# Patient Record
Sex: FEMALE | Race: WHITE | NOT HISPANIC OR LATINO | Employment: FULL TIME | URBAN - NONMETROPOLITAN AREA
[De-identification: names, ages, dates, MRNs, and addresses within clinical notes are randomized per-mention and may not be internally consistent; named-entity substitution may affect disease eponyms.]

---

## 2018-12-13 ENCOUNTER — TRANSCRIBE ORDERS (OUTPATIENT)
Dept: PODIATRY | Facility: CLINIC | Age: 38
End: 2018-12-13

## 2018-12-13 DIAGNOSIS — B35.1 FUNGAL INFECTION OF TOENAIL: Primary | ICD-10-CM

## 2018-12-27 ENCOUNTER — OFFICE VISIT (OUTPATIENT)
Dept: PODIATRY | Facility: CLINIC | Age: 38
End: 2018-12-27

## 2018-12-27 VITALS — BODY MASS INDEX: 35.32 KG/M2 | HEIGHT: 65 IN | WEIGHT: 212 LBS

## 2018-12-27 DIAGNOSIS — B35.1 ONYCHOMYCOSIS: Primary | ICD-10-CM

## 2018-12-27 PROCEDURE — 99203 OFFICE O/P NEW LOW 30 MIN: CPT | Performed by: PODIATRIST

## 2018-12-27 PROCEDURE — 11755 BIOPSY NAIL UNIT: CPT | Performed by: PODIATRIST

## 2018-12-27 NOTE — PROGRESS NOTES
Radha Hough  1980  38 y.o. female     Patient presents to clinic today with complaint of toenail fungus.  Patient states this has been an ongoing issue for the past 10 years.    12/27/2018  Chief Complaint   Patient presents with   • Left Foot - Nail Problem   • Right Foot - Nail Problem           History of Present Illness    Radha Hough is a 38 y.o. female who presents for evaluation of toenail issues on both feet.  She states she believe she has a nail fungus.  She states this has been an ongoing issue for approximately 10 years.  She is tried various antifungal treatments in the past as well as what seems to be a short course of oral antifungal medication.  She states she most recently was treated over the summer.  She feels that the fungus improved but again worsened over time.  She denies any pain or associated redness to the area.  She has no other acute pedal complaints at this time.      Past Medical History:   Diagnosis Date   • Callus          Past Surgical History:   Procedure Laterality Date   • KIDNEY STONE SURGERY           Family History   Problem Relation Age of Onset   • Heart disease Father    • Cancer Maternal Grandmother    • Heart disease Maternal Grandfather          Social History     Socioeconomic History   • Marital status:      Spouse name: Not on file   • Number of children: Not on file   • Years of education: Not on file   • Highest education level: Not on file   Social Needs   • Financial resource strain: Not on file   • Food insecurity - worry: Not on file   • Food insecurity - inability: Not on file   • Transportation needs - medical: Not on file   • Transportation needs - non-medical: Not on file   Occupational History   • Not on file   Tobacco Use   • Smoking status: Former Smoker   • Smokeless tobacco: Never Used   Substance and Sexual Activity   • Alcohol use: No     Frequency: Never   • Drug use: No   • Sexual activity: Defer   Other Topics Concern   • Not on file  "  Social History Narrative   • Not on file         Current Outpatient Medications   Medication Sig Dispense Refill   • Naltrexone-Bupropion HCl (CONTRAVE PO) Take  by mouth.       No current facility-administered medications for this visit.          OBJECTIVE    Ht 165.1 cm (65\")   Wt 96.2 kg (212 lb)   BMI 35.28 kg/m²       Review of Systems   Constitutional: Negative.    HENT: Negative.    Eyes: Negative.    Respiratory: Negative.    Cardiovascular: Negative.    Gastrointestinal: Negative.    Endocrine: Negative.    Genitourinary: Negative.    Musculoskeletal: Negative.    Skin: Negative.    Allergic/Immunologic: Negative.    Neurological: Negative.    Hematological: Negative.    Psychiatric/Behavioral: Negative.          Physical Exam   Constitutional: she appears well-developed and well-nourished.   HEENT: Normocephalic. Atraumatic.  CV: No CP. RRR  Resp: Non-labored respirations.  Psychiatric: she has a normal mood and affect. her behavior is normal.         Lower Extremity Exam:  Vascular: DP/PT pulses palpable 2+.   No edema  Foot warm  Neuro: Protective sensation intact, b/l.  DTRs intact  Negative Tinel over tarsal tunnel  Integument: No open wounds or lesions.  No erythema, scaling  No masses  Nails 1 through 5 bilateral mildly thickened and discolored with proximal colored streaking most notably in the Hallux toenails.  Musculoskeletal: LE muscle strength 5/5.   Gait normal  Ankle ROM decreased without pain or crepitus  STJ ROM full without pain or crepitus      Bilateral hallux nail biopsy:  Risks, benefits discussed.  Biopsy of nail plate taken with nail nipper  Scrapping of nail bed taken with small curette  Samples sent to [facility]  Pt tolerated well          ASSESSMENT AND PLAN    Radha was seen today for nail problem and nail problem.    Diagnoses and all orders for this visit:    Onychomycosis      -Comprehensive foot and ankle exam performed  -Educated pt on diagnosis, etiology and treatment of " onychomycosis  -Pt interested in antifungal therapy  -Biopsy first taken as above.  If results are positive will likely start her on Lamisil for 12 weeks.  It seems that she has not had a long enough duration of treatment with previous treatments tried.  -Recheck allowing biopsy results.            This document has been electronically signed by Rigo Grove DPM on December 31, 2018 11:23 AM     EMR Dragon/Transcription disclaimer:   Much of this encounter note is an electronic transcription/translation of spoken language to printed text. The electronic translation of spoken language may permit erroneous, or at times, nonsensical words or phrases to be inadvertently transcribed; Although I have reviewed the note for such errors, some may still exist.    Rigo Grove DPM  12/31/2018  11:23 AM

## 2019-01-29 ENCOUNTER — OFFICE VISIT (OUTPATIENT)
Dept: PODIATRY | Facility: CLINIC | Age: 39
End: 2019-01-29

## 2019-01-29 VITALS — HEIGHT: 65 IN | WEIGHT: 212 LBS | BODY MASS INDEX: 35.32 KG/M2

## 2019-01-29 DIAGNOSIS — B35.1 ONYCHOMYCOSIS: Primary | ICD-10-CM

## 2019-01-29 PROCEDURE — 99213 OFFICE O/P EST LOW 20 MIN: CPT | Performed by: PODIATRIST

## 2019-01-29 NOTE — PROGRESS NOTES
"Radha Hough  1980  38 y.o. female     Patient presents to clinic today for bako results.    01/29/2019    Chief Complaint   Patient presents with   • Left Foot - Follow-up   • Right Foot - Follow-up           History of Present Illness    Radha Hough is a 38 y.o. female who presents for follow-up of a dystrophic nails on bilateral feet.    Past Medical History:   Diagnosis Date   • Callus          Past Surgical History:   Procedure Laterality Date   • KIDNEY STONE SURGERY           Family History   Problem Relation Age of Onset   • Heart disease Father    • Cancer Maternal Grandmother    • Heart disease Maternal Grandfather          Social History     Socioeconomic History   • Marital status:      Spouse name: Not on file   • Number of children: Not on file   • Years of education: Not on file   • Highest education level: Not on file   Social Needs   • Financial resource strain: Not on file   • Food insecurity - worry: Not on file   • Food insecurity - inability: Not on file   • Transportation needs - medical: Not on file   • Transportation needs - non-medical: Not on file   Occupational History   • Not on file   Tobacco Use   • Smoking status: Former Smoker   • Smokeless tobacco: Never Used   Substance and Sexual Activity   • Alcohol use: No     Frequency: Never   • Drug use: No   • Sexual activity: Defer   Other Topics Concern   • Not on file   Social History Narrative   • Not on file         Current Outpatient Medications   Medication Sig Dispense Refill   • Naltrexone-Bupropion HCl (CONTRAVE PO) Take  by mouth.       No current facility-administered medications for this visit.          OBJECTIVE    Ht 165.1 cm (65\")   Wt 96.2 kg (212 lb)   BMI 35.28 kg/m²       Review of Systems   Constitutional: Negative.    HENT: Negative.    Eyes: Negative.    Respiratory: Negative.    Cardiovascular: Negative.    Gastrointestinal: Negative.    Endocrine: Negative.    Genitourinary: Negative.  "   Musculoskeletal: Negative.    Skin: Negative.    Allergic/Immunologic: Negative.    Neurological: Negative.    Hematological: Negative.    Psychiatric/Behavioral: Negative.          Physical Exam   Constitutional: she appears well-developed and well-nourished.   HEENT: Normocephalic. Atraumatic.  CV: No CP. RRR  Resp: Non-labored respirations.  Psychiatric: she has a normal mood and affect. her behavior is normal.         Lower Extremity Exam:  Vascular: DP/PT pulses palpable 2+.   No edema  Foot warm  Neuro: Protective sensation intact, b/l.  DTRs intact  Negative Tinel over tarsal tunnel  Integument: No open wounds or lesions.  No erythema, scaling  No masses  Nails 1 through 5 bilateral mildly thickened and discolored with proximal colored streaking most notably in the Hallux toenails.  Musculoskeletal: LE muscle strength 5/5.   Gait normal  Ankle ROM decreased without pain or crepitus  STJ ROM full without pain or crepitus    Hepatic Function Panel   Order: [id number]   Status:  Final result   Visible to patient:  No (Not Released) Dx:  Onychomycosis    Ref Range & Units 1d ago   Total Protein 6.3 - 8.6 g/dL 7.4    Albumin 3.40 - 4.80 g/dL 4.50    ALT (SGPT) 9 - 52 U/L 20    AST (SGOT) 14 - 36 U/L 24    Alkaline Phosphatase 38 - 126 U/L 68    Total Bilirubin 0.2 - 1.3 mg/dL 0.3    Bilirubin, Direct 0.0 - 0.3 mg/dL 0.0    Bilirubin, Indirect 0.0 - 1.1 mg/dL 0.4    Resulting Agency  [facility]         Specimen Collected: 01/30/19 11:54 Last Resulted: 01/30/19 12:45                   ASSESSMENT AND PLAN    Radha was seen today for follow-up and follow-up.    Diagnoses and all orders for this visit:    Onychomycosis  -     Hepatic Function Panel; Future      -Comprehensive foot and ankle exam performed  -Educated pt on diagnosis, etiology and treatment of onychomycosis  -Prior nail biopsy positive for saprophytic mold.  -LFTs within normal limits.  -Will place on itraconazole ×12 weeks  -Recheck LFTs in 4 weeks'  time  -Recheck 6 weeks            This document has been electronically signed by Rigo Grove DPM on January 31, 2019 1:41 PM     EMR Dragon/Transcription disclaimer:   Much of this encounter note is an electronic transcription/translation of spoken language to printed text. The electronic translation of spoken language may permit erroneous, or at times, nonsensical words or phrases to be inadvertently transcribed; Although I have reviewed the note for such errors, some may still exist.    Rigo Grove DPM  1/31/2019  1:41 PM

## 2019-01-30 ENCOUNTER — LAB (OUTPATIENT)
Dept: LAB | Facility: HOSPITAL | Age: 39
End: 2019-01-30

## 2019-01-30 DIAGNOSIS — B35.1 ONYCHOMYCOSIS: ICD-10-CM

## 2019-01-30 LAB
ALBUMIN SERPL-MCNC: 4.5 G/DL (ref 3.4–4.8)
ALP SERPL-CCNC: 68 U/L (ref 38–126)
ALT SERPL W P-5'-P-CCNC: 20 U/L (ref 9–52)
AST SERPL-CCNC: 24 U/L (ref 14–36)
BILIRUB CONJ SERPL-MCNC: 0 MG/DL (ref 0–0.3)
BILIRUB INDIRECT SERPL-MCNC: 0.4 MG/DL (ref 0–1.1)
BILIRUB SERPL-MCNC: 0.3 MG/DL (ref 0.2–1.3)
PROT SERPL-MCNC: 7.4 G/DL (ref 6.3–8.6)

## 2019-01-30 PROCEDURE — 80076 HEPATIC FUNCTION PANEL: CPT

## 2019-01-30 PROCEDURE — 36415 COLL VENOUS BLD VENIPUNCTURE: CPT

## 2019-02-01 ENCOUNTER — TELEPHONE (OUTPATIENT)
Dept: PODIATRY | Facility: CLINIC | Age: 39
End: 2019-02-01

## 2019-02-01 RX ORDER — TERBINAFINE HYDROCHLORIDE 250 MG/1
250 TABLET ORAL DAILY
Qty: 28 TABLET | Refills: 2 | Status: SHIPPED | OUTPATIENT
Start: 2019-02-01 | End: 2020-09-28

## 2019-02-01 NOTE — TELEPHONE ENCOUNTER
SPOKE TO PATIENT AND RELAYED MESSAGE YOU LEFT WITH ME.    DR WILL HAVE SOMETHING NEW SENT TO THE PHARMACY BY THE END OF THE DAY TO REPLACE MED THAT IS NOT COVERED BY INSURANCE.

## 2019-02-01 NOTE — TELEPHONE ENCOUNTER
LACIE      SAYS THAT THE MEDS DOCTOR PRESCRIBED ARE NOT COVERED BY HER INSURANCE ($1200).    IS THERE SOMETHING ELSE SHE CAN TAKE IN REPLACEMENT?

## 2019-03-12 ENCOUNTER — TELEPHONE (OUTPATIENT)
Dept: PODIATRY | Facility: CLINIC | Age: 39
End: 2019-03-12

## 2019-03-12 DIAGNOSIS — B35.1 ONYCHOMYCOSIS: Primary | ICD-10-CM

## 2019-03-19 ENCOUNTER — LAB (OUTPATIENT)
Dept: LAB | Facility: HOSPITAL | Age: 39
End: 2019-03-19

## 2019-03-19 DIAGNOSIS — B35.1 ONYCHOMYCOSIS: ICD-10-CM

## 2019-03-19 LAB
ALBUMIN SERPL-MCNC: 4.4 G/DL (ref 3.4–4.8)
ALP SERPL-CCNC: 58 U/L (ref 38–126)
ALT SERPL W P-5'-P-CCNC: 17 U/L (ref 9–52)
AST SERPL-CCNC: 27 U/L (ref 14–36)
BILIRUB CONJ SERPL-MCNC: 0 MG/DL (ref 0–0.3)
BILIRUB INDIRECT SERPL-MCNC: 0.5 MG/DL (ref 0–1.1)
BILIRUB SERPL-MCNC: 0.4 MG/DL (ref 0.2–1.3)
PROT SERPL-MCNC: 7.6 G/DL (ref 6.3–8.6)

## 2019-03-19 PROCEDURE — 36415 COLL VENOUS BLD VENIPUNCTURE: CPT

## 2019-03-19 PROCEDURE — 80076 HEPATIC FUNCTION PANEL: CPT

## 2019-04-09 ENCOUNTER — OFFICE VISIT (OUTPATIENT)
Dept: PODIATRY | Facility: CLINIC | Age: 39
End: 2019-04-09

## 2019-04-09 VITALS — HEIGHT: 65 IN | WEIGHT: 212 LBS | BODY MASS INDEX: 35.32 KG/M2

## 2019-04-09 DIAGNOSIS — B35.1 ONYCHOMYCOSIS: Primary | ICD-10-CM

## 2019-04-09 PROCEDURE — 99213 OFFICE O/P EST LOW 20 MIN: CPT | Performed by: PODIATRIST

## 2019-04-09 NOTE — PROGRESS NOTES
"Radha Hough  1980  38 y.o. female     Patient presents to clinic today for a recheck on a toenail issue.    04/09/2019      Chief Complaint   Patient presents with   • Left Foot - Follow-up   • Right Foot - Follow-up           History of Present Illness    Radha Hough is a 38 y.o. female who presents for follow-up of a dystrophic nails on bilateral feet.  She has taken 8 weeks worth of Lamisil and repeated her lab work since last visit.  She states that she has been due to  her final refill of Lamisil for a couple of weeks.    Past Medical History:   Diagnosis Date   • Callus          Past Surgical History:   Procedure Laterality Date   • KIDNEY STONE SURGERY           Family History   Problem Relation Age of Onset   • Heart disease Father    • Cancer Maternal Grandmother    • Heart disease Maternal Grandfather          Social History     Socioeconomic History   • Marital status:      Spouse name: Not on file   • Number of children: Not on file   • Years of education: Not on file   • Highest education level: Not on file   Tobacco Use   • Smoking status: Former Smoker   • Smokeless tobacco: Never Used   Substance and Sexual Activity   • Alcohol use: No     Frequency: Never   • Drug use: No   • Sexual activity: Defer         Current Outpatient Medications   Medication Sig Dispense Refill   • Naltrexone-Bupropion HCl (CONTRAVE PO) Take  by mouth.     • terbinafine (lamiSIL) 250 MG tablet Take 1 tablet by mouth Daily. 28 tablet 2     No current facility-administered medications for this visit.          OBJECTIVE    Ht 165.1 cm (65\")   Wt 96.2 kg (212 lb)   BMI 35.28 kg/m²       Review of Systems   Constitutional: Negative.    HENT: Negative.    Eyes: Negative.    Respiratory: Negative.    Cardiovascular: Negative.    Gastrointestinal: Negative.    Endocrine: Negative.    Genitourinary: Negative.    Musculoskeletal: Negative.    Skin: Negative.    Allergic/Immunologic: Negative.    Neurological: " Negative.    Hematological: Negative.    Psychiatric/Behavioral: Negative.          Physical Exam   Constitutional: she appears well-developed and well-nourished.   HEENT: Normocephalic. Atraumatic.  CV: No CP. RRR  Resp: Non-labored respirations.  Psychiatric: she has a normal mood and affect. her behavior is normal.         Lower Extremity Exam:  Vascular: DP/PT pulses palpable 2+.   No edema  Foot warm  Neuro: Protective sensation intact, b/l.  DTRs intact  Negative Tinel over tarsal tunnel  Integument: No open wounds or lesions.  No erythema, scaling  No masses  Nails 1 through 5 bilateral mildly thickened and discolored with proximal colored streaking most notably in the Hallux toenails.  Musculoskeletal: LE muscle strength 5/5.   Gait normal  Ankle ROM decreased without pain or crepitus  STJ ROM full without pain or crepitus    Hepatic Function Panel   Order: [id number]   Status:  Final result   Visible to patient:  Yes (MyChart) Dx:  Onychomycosis    Ref Range & Units 3wk ago   Total Protein 6.3 - 8.6 g/dL 7.6    Albumin 3.40 - 4.80 g/dL 4.40    ALT (SGPT) 9 - 52 U/L 17    AST (SGOT) 14 - 36 U/L 27    Alkaline Phosphatase 38 - 126 U/L 58    Total Bilirubin 0.2 - 1.3 mg/dL 0.4    Bilirubin, Direct 0.0 - 0.3 mg/dL 0.0    Bilirubin, Indirect 0.0 - 1.1 mg/dL 0.5    Resulting Agency  [facility]         Specimen Collected: 03/19/19 10:13 Last Resulted: 03/19/19 11:31               ASSESSMENT AND PLAN    Radha was seen today for follow-up and follow-up.    Diagnoses and all orders for this visit:    Onychomycosis      -Comprehensive foot and ankle exam performed  -Educated pt on diagnosis, etiology and treatment of onychomycosis  -LFTs are stable.  Complete terbinafine course.  -Recheck as needed            This document has been electronically signed by Rigo Grove DPM on April 14, 2019 6:11 PM     EMR Dragon/Transcription disclaimer:   Much of this encounter note is an electronic transcription/translation of  spoken language to printed text. The electronic translation of spoken language may permit erroneous, or at times, nonsensical words or phrases to be inadvertently transcribed; Although I have reviewed the note for such errors, some may still exist.    Rigo Grove DPM  4/14/2019  6:11 PM

## 2020-02-19 ENCOUNTER — OFFICE VISIT (OUTPATIENT)
Dept: FAMILY MEDICINE CLINIC | Facility: CLINIC | Age: 40
End: 2020-02-19

## 2020-02-19 VITALS
WEIGHT: 233.7 LBS | DIASTOLIC BLOOD PRESSURE: 74 MMHG | SYSTOLIC BLOOD PRESSURE: 120 MMHG | HEIGHT: 65 IN | BODY MASS INDEX: 38.93 KG/M2

## 2020-02-19 DIAGNOSIS — Z13.29 SCREENING FOR THYROID DISORDER: ICD-10-CM

## 2020-02-19 DIAGNOSIS — Z30.41 ENCOUNTER FOR SURVEILLANCE OF CONTRACEPTIVE PILLS: ICD-10-CM

## 2020-02-19 DIAGNOSIS — Z13.220 SCREENING FOR LIPID DISORDERS: ICD-10-CM

## 2020-02-19 DIAGNOSIS — Z76.89 ENCOUNTER TO ESTABLISH CARE: ICD-10-CM

## 2020-02-19 DIAGNOSIS — E66.09 CLASS 2 OBESITY DUE TO EXCESS CALORIES WITHOUT SERIOUS COMORBIDITY WITH BODY MASS INDEX (BMI) OF 35.0 TO 35.9 IN ADULT: Primary | ICD-10-CM

## 2020-02-19 DIAGNOSIS — F41.9 ANXIETY: ICD-10-CM

## 2020-02-19 PROBLEM — N20.0 KIDNEY STONES: Status: ACTIVE | Noted: 2020-02-19

## 2020-02-19 PROCEDURE — 99214 OFFICE O/P EST MOD 30 MIN: CPT | Performed by: NURSE PRACTITIONER

## 2020-02-19 RX ORDER — DESOGESTREL AND ETHINYL ESTRADIOL 0.15-0.03
1 KIT ORAL DAILY
COMMUNITY
End: 2021-11-10

## 2020-02-19 NOTE — PROGRESS NOTES
Subjective   Radha Hough is a 39 y.o. female.  Establish primary care.  Currently sees Dr. Hatfield at [facility] for GYN care and is up to date on her pap smear and mammogram. Has been on Contrave in the past and stopped due to nausea.    Obesity   This is a chronic problem. The current episode started more than 1 year ago. The problem occurs constantly. The problem has been unchanged. Pertinent negatives include no chills, diaphoresis, fatigue, fever, headaches, nausea, sore throat or vomiting. The symptoms are aggravated by drinking and eating. Treatments tried: Contrave  The treatment provided mild relief.   Contraception   This is a new problem. The current episode started more than 1 month ago. The problem occurs constantly. The problem has been gradually improving. Pertinent negatives include no chills, diaphoresis, fatigue, fever, headaches, nausea, sore throat or vomiting. Nothing aggravates the symptoms. Treatments tried: APRI. The treatment provided significant relief.   Anxiety   Presents for initial visit. Onset was 1 to 6 months ago. The problem has been unchanged. Patient reports no confusion, nausea or shortness of breath. Symptoms occur rarely. The symptoms are aggravated by family issues (son is autistic). The quality of sleep is good.     Past treatments include benzodiazephines. The treatment provided moderate relief. Compliance with prior treatments has been good.        The following portions of the patient's history were reviewed and updated as appropriate:   She  has a past medical history of Callus.  She does not have any pertinent problems on file.  She  has a past surgical history that includes Kidney stone surgery.  Her family history includes Cancer in her maternal grandmother; Heart disease in her father and maternal grandfather.  She  reports that she has quit smoking. She has never used smokeless tobacco. She reports that she does not drink alcohol or use drugs.  Current Outpatient  "Medications   Medication Sig Dispense Refill   • desogestrel-ethinyl estradiol (APRI) 0.15-30 MG-MCG per tablet Take 1 tablet by mouth Daily.     • Lorcaserin HCl ER 20 MG tablet sustained-release 24 hour Take 20 mg by mouth Daily. 30 tablet 5   • terbinafine (lamiSIL) 250 MG tablet Take 1 tablet by mouth Daily. 28 tablet 2     No current facility-administered medications for this visit.      Current Outpatient Medications on File Prior to Visit   Medication Sig   • desogestrel-ethinyl estradiol (APRI) 0.15-30 MG-MCG per tablet Take 1 tablet by mouth Daily.   • terbinafine (lamiSIL) 250 MG tablet Take 1 tablet by mouth Daily.   • [DISCONTINUED] Naltrexone-Bupropion HCl (CONTRAVE PO) Take  by mouth.     No current facility-administered medications on file prior to visit.      She has No Known Allergies..    Review of Systems   Constitutional: Negative.  Negative for chills, diaphoresis, fatigue and fever.   HENT: Negative.  Negative for ear pain, rhinorrhea and sore throat.    Eyes: Negative.    Respiratory: Negative.  Negative for shortness of breath.    Cardiovascular: Negative.    Gastrointestinal: Negative.  Negative for constipation, nausea and vomiting.   Genitourinary: Positive for dysuria.   Musculoskeletal: Negative.  Negative for gait problem.   Skin: Negative.    Neurological: Negative.  Negative for headaches.   Psychiatric/Behavioral: Negative.  Negative for confusion.       Objective    Visit Vitals  /74   Ht 165.1 cm (65\")   Wt 106 kg (233 lb 11.2 oz)   LMP 02/08/2020 (Exact Date)   BMI 38.89 kg/m²       Physical Exam   Constitutional: She is oriented to person, place, and time. She appears well-developed and well-nourished.   HENT:   Head: Normocephalic.   Right Ear: External ear normal.   Left Ear: External ear normal.   Eyes: Pupils are equal, round, and reactive to light. EOM are normal.   Neck: Normal range of motion. Neck supple.   Cardiovascular: Normal rate, regular rhythm and normal " heart sounds.   No murmur heard.  Pulmonary/Chest: Effort normal and breath sounds normal. She has no wheezes.   Abdominal: Soft. Bowel sounds are normal. She exhibits no mass. There is no tenderness. There is no guarding.   Musculoskeletal: Normal range of motion. She exhibits no edema or tenderness.   Neurological: She is alert and oriented to person, place, and time.   Skin: Skin is warm. Capillary refill takes less than 2 seconds.   Psychiatric: She has a normal mood and affect. Her behavior is normal.   Nursing note and vitals reviewed.      Assessment/Plan   Problems Addressed this Visit        Digestive    Class 2 obesity due to excess calories without serious comorbidity with body mass index (BMI) of 35.0 to 35.9 in adult - Primary    Relevant Medications    Lorcaserin HCl ER 20 MG tablet sustained-release 24 hour    Other Relevant Orders    CBC & Differential    Comprehensive Metabolic Panel       Other    Anxiety      Other Visit Diagnoses     Encounter for surveillance of contraceptive pills        Screening for thyroid disorder        Relevant Orders    TSH    Screening for lipid disorders        Relevant Orders    Lipid Panel    Encounter to establish care            New Medications Ordered This Visit   Medications   • Lorcaserin HCl ER 20 MG tablet sustained-release 24 hour     Sig: Take 20 mg by mouth Daily.     Dispense:  30 tablet     Refill:  5     1. Obesity:  Weight CBC and chemistry panel as ordered and will notify results when available  Discontinue Contrave  Begin Belviq as prescribed  Educated on possible side effects of this medication including but not limited to increased risk for nausea, indigestion and abdominal discomfort  Encouraged to reduce daily caloric intake to no more than 2000 toya/day  Encouraged to increase physical activity regimen to a minimum of 150 minutes/week  Discussed healthy food options such as baked chicken and fish, fresh fruits and vegetables and salads  Encouraged  to increase fluids to help remote hydration  Encouraged to increase fiber rich foods in diet    2. Oral contraceptive use  Taking Desogestrel-ethinyl estradiol 0.15-30 packet  Will continue gynecological follow-up with [facility] [place]    3. Anxiety  Currently taking Klonopin and reports that this is working.  Educated on possible sedative side effects of this medication    4.  Screening for thyroid disorder:  Complete TSH as ordered and will notify results when available    5.  Screening for lipid disorders:  Complete fasting lipid panel as ordered and will notify results when available  Encouraged adhere to low-fat diet    6.  Encounter to establish care:  Continue on current medications as previously prescribed   I spent 28 minutes in direct face to face contact with patient.  Greater than 50% of this time was spent counseling patient and discussing plan of care.  Return in about 1 year (around 2/19/2021) for Annual physical.        This document has been electronically signed by NISREEN Cazares on February 19, 2020 1:40 PM

## 2020-02-20 ENCOUNTER — LAB (OUTPATIENT)
Dept: LAB | Facility: HOSPITAL | Age: 40
End: 2020-02-20

## 2020-02-20 DIAGNOSIS — E66.09 CLASS 2 OBESITY DUE TO EXCESS CALORIES WITHOUT SERIOUS COMORBIDITY WITH BODY MASS INDEX (BMI) OF 35.0 TO 35.9 IN ADULT: ICD-10-CM

## 2020-02-20 DIAGNOSIS — Z13.220 SCREENING FOR LIPID DISORDERS: ICD-10-CM

## 2020-02-20 DIAGNOSIS — Z13.29 SCREENING FOR THYROID DISORDER: ICD-10-CM

## 2020-02-20 LAB
ALBUMIN SERPL-MCNC: 4 G/DL (ref 3.5–5.2)
ALBUMIN/GLOB SERPL: 1.4 G/DL
ALP SERPL-CCNC: 54 U/L (ref 39–117)
ALT SERPL W P-5'-P-CCNC: 12 U/L (ref 1–33)
ANION GAP SERPL CALCULATED.3IONS-SCNC: 11.1 MMOL/L (ref 5–15)
AST SERPL-CCNC: 16 U/L (ref 1–32)
BASOPHILS # BLD AUTO: 0.03 10*3/MM3 (ref 0–0.2)
BASOPHILS NFR BLD AUTO: 0.5 % (ref 0–1.5)
BILIRUB SERPL-MCNC: 0.2 MG/DL (ref 0.2–1.2)
BUN BLD-MCNC: 15 MG/DL (ref 6–20)
BUN/CREAT SERPL: 19.5 (ref 7–25)
CALCIUM SPEC-SCNC: 9 MG/DL (ref 8.6–10.5)
CHLORIDE SERPL-SCNC: 105 MMOL/L (ref 98–107)
CHOLEST SERPL-MCNC: 228 MG/DL (ref 0–200)
CO2 SERPL-SCNC: 22.9 MMOL/L (ref 22–29)
CREAT BLD-MCNC: 0.77 MG/DL (ref 0.57–1)
DEPRECATED RDW RBC AUTO: 39.6 FL (ref 37–54)
EOSINOPHIL # BLD AUTO: 0.14 10*3/MM3 (ref 0–0.4)
EOSINOPHIL NFR BLD AUTO: 2.5 % (ref 0.3–6.2)
ERYTHROCYTE [DISTWIDTH] IN BLOOD BY AUTOMATED COUNT: 12.8 % (ref 12.3–15.4)
GFR SERPL CREATININE-BSD FRML MDRD: 83 ML/MIN/1.73
GLOBULIN UR ELPH-MCNC: 2.8 GM/DL
GLUCOSE BLD-MCNC: 87 MG/DL (ref 65–99)
HCT VFR BLD AUTO: 40.3 % (ref 34–46.6)
HDLC SERPL-MCNC: 52 MG/DL (ref 40–60)
HGB BLD-MCNC: 13.5 G/DL (ref 12–15.9)
IMM GRANULOCYTES # BLD AUTO: 0.02 10*3/MM3 (ref 0–0.05)
IMM GRANULOCYTES NFR BLD AUTO: 0.4 % (ref 0–0.5)
LDLC SERPL CALC-MCNC: 134 MG/DL (ref 0–100)
LDLC/HDLC SERPL: 2.57 {RATIO}
LYMPHOCYTES # BLD AUTO: 1.43 10*3/MM3 (ref 0.7–3.1)
LYMPHOCYTES NFR BLD AUTO: 25.8 % (ref 19.6–45.3)
MCH RBC QN AUTO: 28.7 PG (ref 26.6–33)
MCHC RBC AUTO-ENTMCNC: 33.5 G/DL (ref 31.5–35.7)
MCV RBC AUTO: 85.6 FL (ref 79–97)
MONOCYTES # BLD AUTO: 0.42 10*3/MM3 (ref 0.1–0.9)
MONOCYTES NFR BLD AUTO: 7.6 % (ref 5–12)
NEUTROPHILS # BLD AUTO: 3.5 10*3/MM3 (ref 1.7–7)
NEUTROPHILS NFR BLD AUTO: 63.2 % (ref 42.7–76)
NRBC BLD AUTO-RTO: 0 /100 WBC (ref 0–0.2)
PLATELET # BLD AUTO: 283 10*3/MM3 (ref 140–450)
PMV BLD AUTO: 10.7 FL (ref 6–12)
POTASSIUM BLD-SCNC: 4.5 MMOL/L (ref 3.5–5.2)
PROT SERPL-MCNC: 6.8 G/DL (ref 6–8.5)
RBC # BLD AUTO: 4.71 10*6/MM3 (ref 3.77–5.28)
SODIUM BLD-SCNC: 139 MMOL/L (ref 136–145)
TRIGL SERPL-MCNC: 212 MG/DL (ref 0–150)
TSH SERPL DL<=0.05 MIU/L-ACNC: 1.58 UIU/ML (ref 0.27–4.2)
VLDLC SERPL-MCNC: 42.4 MG/DL (ref 5–40)
WBC NRBC COR # BLD: 5.54 10*3/MM3 (ref 3.4–10.8)

## 2020-02-20 PROCEDURE — 85025 COMPLETE CBC W/AUTO DIFF WBC: CPT

## 2020-02-20 PROCEDURE — 80061 LIPID PANEL: CPT

## 2020-02-20 PROCEDURE — 80053 COMPREHEN METABOLIC PANEL: CPT

## 2020-02-20 PROCEDURE — 84443 ASSAY THYROID STIM HORMONE: CPT

## 2020-02-22 DIAGNOSIS — E78.5 DYSLIPIDEMIA: Primary | ICD-10-CM

## 2020-02-24 ENCOUNTER — TELEPHONE (OUTPATIENT)
Dept: FAMILY MEDICINE CLINIC | Facility: CLINIC | Age: 40
End: 2020-02-24

## 2020-02-24 DIAGNOSIS — E66.09 CLASS 2 OBESITY DUE TO EXCESS CALORIES WITHOUT SERIOUS COMORBIDITY WITH BODY MASS INDEX (BMI) OF 35.0 TO 35.9 IN ADULT: Primary | ICD-10-CM

## 2020-02-24 RX ORDER — PEN NEEDLE, DIABETIC 30 GX3/16"
1 NEEDLE, DISPOSABLE MISCELLANEOUS DAILY
Qty: 30 EACH | Refills: 3 | Status: SHIPPED | OUTPATIENT
Start: 2020-02-24 | End: 2020-06-05 | Stop reason: SDUPTHER

## 2020-02-24 NOTE — TELEPHONE ENCOUNTER
Per NISREEN Dean, Ms. Hough has been called with recent lab results & recommendations.  Continue current medications and follow-up as planned or sooner if any problems.      ----- Message from NISREEN Cazares sent at 2/22/2020  4:30 PM CST -----  Cholesterol levels elevated.  She needs to adhere to low fat diet and increase exercising. Will repeat fasting lipid panel in 3 months.  All other labs normal.

## 2020-02-24 NOTE — PROGRESS NOTES
Per NISREEN Dean, Ms. Hough has been called with recent lab results & recommendations.  Continue current medications and follow-up as planned or sooner if any problems.

## 2020-06-05 DIAGNOSIS — E66.09 CLASS 2 OBESITY DUE TO EXCESS CALORIES WITHOUT SERIOUS COMORBIDITY WITH BODY MASS INDEX (BMI) OF 35.0 TO 35.9 IN ADULT: ICD-10-CM

## 2020-06-05 RX ORDER — PEN NEEDLE, DIABETIC 30 GX3/16"
1 NEEDLE, DISPOSABLE MISCELLANEOUS DAILY
Qty: 30 EACH | Refills: 3 | Status: SHIPPED | OUTPATIENT
Start: 2020-06-05 | End: 2020-09-28

## 2020-07-01 DIAGNOSIS — E66.09 CLASS 2 OBESITY DUE TO EXCESS CALORIES WITHOUT SERIOUS COMORBIDITY WITH BODY MASS INDEX (BMI) OF 35.0 TO 35.9 IN ADULT: ICD-10-CM

## 2020-07-01 RX ORDER — LIRAGLUTIDE 6 MG/ML
INJECTION, SOLUTION SUBCUTANEOUS
Qty: 5 PEN | Refills: 3 | Status: SHIPPED | OUTPATIENT
Start: 2020-07-01 | End: 2020-09-28

## 2020-09-28 ENCOUNTER — OFFICE VISIT (OUTPATIENT)
Dept: FAMILY MEDICINE CLINIC | Facility: CLINIC | Age: 40
End: 2020-09-28

## 2020-09-28 VITALS
HEIGHT: 65 IN | DIASTOLIC BLOOD PRESSURE: 64 MMHG | WEIGHT: 237 LBS | SYSTOLIC BLOOD PRESSURE: 106 MMHG | BODY MASS INDEX: 39.49 KG/M2

## 2020-09-28 DIAGNOSIS — E66.09 CLASS 2 OBESITY DUE TO EXCESS CALORIES WITHOUT SERIOUS COMORBIDITY WITH BODY MASS INDEX (BMI) OF 39.0 TO 39.9 IN ADULT: Primary | ICD-10-CM

## 2020-09-28 DIAGNOSIS — R12 HEARTBURN: ICD-10-CM

## 2020-09-28 PROCEDURE — 99213 OFFICE O/P EST LOW 20 MIN: CPT | Performed by: NURSE PRACTITIONER

## 2020-09-28 NOTE — PROGRESS NOTES
"Subjective   Radha Hough is a 40 y.o. female.  For the past several months has been complaining of increasing heartburn.  \"I started on Saxenda this past March and ever since I started taking it my heartburn was so bad.  Eventually it stopped taking it.\"  Continues to complain of increasing weight since stopping Saxenda.  Over the last several weeks has been trying to diet and increase her exercise.  \"I have lost about 5 or 6 pounds so I am doing better.\"    Heartburn  She complains of belching, heartburn and nausea. This is a chronic problem. The current episode started more than 1 month ago. The problem has been resolved. Pertinent negatives include no fatigue.   Obesity  This is a chronic problem. The current episode started more than 1 year ago. The problem occurs constantly. The problem has been unchanged. Associated symptoms include nausea. Pertinent negatives include no chills, diaphoresis, fatigue or fever. The symptoms are aggravated by drinking and eating. She has tried walking (Diet changes) for the symptoms. The treatment provided mild relief.        The following portions of the patient's history were reviewed and updated as appropriate:     Current Outpatient Medications   Medication Sig Dispense Refill   • desogestrel-ethinyl estradiol (APRI) 0.15-30 MG-MCG per tablet Take 1 tablet by mouth Daily.       No current facility-administered medications for this visit.      Current Outpatient Medications on File Prior to Visit   Medication Sig   • desogestrel-ethinyl estradiol (APRI) 0.15-30 MG-MCG per tablet Take 1 tablet by mouth Daily.   • [DISCONTINUED] terbinafine (lamiSIL) 250 MG tablet Take 1 tablet by mouth Daily.   • [DISCONTINUED] Insulin Pen Needle (PEN NEEDLES) 32G X 4 MM misc 1 each Daily. Use to inject Victoza Once daily as directed.   • [DISCONTINUED] SAXENDA 18 MG/3ML solution pen-injector START 0.6 MG SQ DAILY X1 WEEK, INCREASE BY 0.6 MG WEEKLY AS TOLERATED TO A MAX DOSE OF 3 MG SQ DAILY " "    No current facility-administered medications on file prior to visit.      She has No Known Allergies..    Review of Systems   Constitutional: Negative.  Negative for chills, diaphoresis, fatigue and fever.   HENT: Negative.    Respiratory: Negative.    Cardiovascular: Negative.    Gastrointestinal: Positive for heartburn and nausea.   Genitourinary: Negative.    Musculoskeletal: Negative.    Skin: Negative.    Neurological: Negative.    Psychiatric/Behavioral: Negative.  Negative for confusion.       Objective    Visit Vitals  /64   Ht 165.1 cm (65\")   Wt 108 kg (237 lb)   LMP 09/15/2020 (Exact Date)   BMI 39.44 kg/m²       Physical Exam  Vitals signs and nursing note reviewed.   Constitutional:       Appearance: She is well-developed.   HENT:      Head: Normocephalic.   Neck:      Musculoskeletal: Normal range of motion and neck supple.   Cardiovascular:      Rate and Rhythm: Normal rate and regular rhythm.      Heart sounds: Normal heart sounds.   Pulmonary:      Effort: Pulmonary effort is normal.      Breath sounds: Normal breath sounds.   Abdominal:      General: Bowel sounds are normal.      Palpations: Abdomen is soft.   Musculoskeletal: Normal range of motion.   Skin:     General: Skin is warm.      Capillary Refill: Capillary refill takes less than 2 seconds.   Neurological:      Mental Status: She is alert and oriented to person, place, and time.   Psychiatric:         Behavior: Behavior normal.         Assessment/Plan   Problems Addressed this Visit        Digestive    Class 2 obesity due to excess calories without serious comorbidity with body mass index (BMI) of 39.0 to 39.9 in adult - Primary      Other Visit Diagnoses     Heartburn          Diagnoses       Codes Comments    Class 2 obesity due to excess calories without serious comorbidity with body mass index (BMI) of 39.0 to 39.9 in adult    -  Primary ICD-10-CM: E66.09, Z68.39  ICD-9-CM: 278.00, V85.39     Heartburn     ICD-10-CM: " R12  ICD-9-CM: 787.1         1.  Class II obesity due to excess calories without serious comorbidity with a body mass index of 39.9-39.9 in adult:  Encouraged to continue with low calorie diet  Encouraged to continue with exercise routine of a minimum of 150 minutes/week  Discussed healthier food choices such as baked chicken and fish, salads and more fresh fruits and vegetables  Encouraged to walk a minimum of 10,000 steps per day  Has tried Saxenda and Contrave in the past with side effects to each medication  Discussed possibility of phentermine at next appointment if no improvement or worsening weight gain    2.  Heartburn:  Symptoms have resolved  May use over-the-counter products such as Prilosec, Nexium, Pepcid according to package directions    Continue on current medications as previously prescribed   I spent 19 minutes in direct face to face contact with patient.  Greater than 50% of this time was spent counseling patient and discussing plan of care.  Return in about 3 months (around 12/28/2020) for Recheck for obesity .        This document has been electronically signed by NISREEN Cazares on September 28, 2020 12:34 CDT

## 2020-10-01 ENCOUNTER — LAB (OUTPATIENT)
Dept: LAB | Facility: HOSPITAL | Age: 40
End: 2020-10-01

## 2020-10-01 DIAGNOSIS — E78.5 DYSLIPIDEMIA: ICD-10-CM

## 2020-10-01 LAB
CHOLEST SERPL-MCNC: 221 MG/DL (ref 0–200)
HDLC SERPL-MCNC: 53 MG/DL (ref 40–60)
LDLC SERPL CALC-MCNC: 125 MG/DL (ref 0–100)
LDLC/HDLC SERPL: 2.36 {RATIO}
TRIGL SERPL-MCNC: 215 MG/DL (ref 0–150)
VLDLC SERPL-MCNC: 43 MG/DL (ref 5–40)

## 2020-10-01 PROCEDURE — 80061 LIPID PANEL: CPT

## 2020-10-14 ENCOUNTER — TELEPHONE (OUTPATIENT)
Dept: FAMILY MEDICINE CLINIC | Facility: CLINIC | Age: 40
End: 2020-10-14

## 2020-10-14 NOTE — TELEPHONE ENCOUNTER
-Per NISREEN Gaona ,Ms Hough has been called with recent lab results & recommendations.  Continue current medications and follow-up as planned or sooner if any problems.      ---- Message from NISREEN Cazares sent at 10/10/2020  9:27 AM CDT -----  Slight improvement in cholesterol levels but they remain elevated.  She needs to continue to adhere to a low-fat diet and continue increasing exercise such as walking.  Oatmeal has also been shown to help reduce cholesterol levels.  She needs to begin taking fish oil without milligrams daily.  Just take it with breakfast and that will help to reduce triglyceride levels.

## 2020-10-14 NOTE — PROGRESS NOTES
Per NISREEN Gaona ,Ms Hough has been called with recent lab results & recommendations.  Continue current medications and follow-up as planned or sooner if any problems.

## 2021-09-01 ENCOUNTER — OFFICE VISIT (OUTPATIENT)
Dept: GASTROENTEROLOGY | Facility: CLINIC | Age: 41
End: 2021-09-01

## 2021-09-01 VITALS
DIASTOLIC BLOOD PRESSURE: 80 MMHG | BODY MASS INDEX: 41.09 KG/M2 | SYSTOLIC BLOOD PRESSURE: 126 MMHG | WEIGHT: 246.6 LBS | HEIGHT: 65 IN | HEART RATE: 85 BPM

## 2021-09-01 DIAGNOSIS — R10.13 EPIGASTRIC PAIN: ICD-10-CM

## 2021-09-01 DIAGNOSIS — K21.9 GASTROESOPHAGEAL REFLUX DISEASE, UNSPECIFIED WHETHER ESOPHAGITIS PRESENT: Primary | ICD-10-CM

## 2021-09-01 PROBLEM — N92.6 IRREGULAR MENSTRUAL CYCLE: Status: ACTIVE | Noted: 2021-07-10

## 2021-09-01 PROBLEM — F41.8 MIXED ANXIETY AND DEPRESSIVE DISORDER: Status: ACTIVE | Noted: 2021-07-10

## 2021-09-01 PROBLEM — K59.00 CONSTIPATION: Status: ACTIVE | Noted: 2021-07-10

## 2021-09-01 PROCEDURE — 99213 OFFICE O/P EST LOW 20 MIN: CPT | Performed by: NURSE PRACTITIONER

## 2021-09-01 RX ORDER — LOVASTATIN 10 MG/1
TABLET ORAL
COMMUNITY
Start: 2021-08-05 | End: 2022-05-26

## 2021-09-01 RX ORDER — DEXTROSE AND SODIUM CHLORIDE 5; .45 G/100ML; G/100ML
30 INJECTION, SOLUTION INTRAVENOUS CONTINUOUS PRN
Status: CANCELLED | OUTPATIENT
Start: 2021-10-13

## 2021-09-01 RX ORDER — CLONAZEPAM 0.5 MG/1
0.5 TABLET ORAL DAILY PRN
COMMUNITY
Start: 2021-08-20

## 2021-09-01 RX ORDER — OMEPRAZOLE 20 MG/1
CAPSULE, DELAYED RELEASE ORAL
COMMUNITY
Start: 2021-08-20 | End: 2022-05-26

## 2021-09-01 NOTE — PROGRESS NOTES
Chief Complaint   Patient presents with   • Heartburn       Subjective    Radha Hough is a 41 y.o. female. she is here today for follow-up.    41-year-old female presents to discuss worsening reflux.  States symptoms started about 2015 when she was pregnant gained a lot of weight.  States her weight has gone up and down about 60 pounds difference over the last 6 years and subsequently worsening reflux.  States she is going to have bariatric surgery if she continues to gain.  She has been taking omeprazole 20 recently increased omeprazole 40 but has not yet started that new dose.  She denies any choking but states she can feel food going down her esophagus at times it feels like it sits but then it goes on down and just burns as it goes.    Heartburn  She complains of abdominal pain, belching, early satiety and heartburn. She reports no chest pain, no choking, no coughing, no dysphagia, no globus sensation, no hoarse voice or no nausea. This is a chronic problem. The problem has been gradually worsening. The heartburn is located in the substernum. The heartburn is of moderate intensity. The heartburn wakes her from sleep. The heartburn doesn't change with position. The symptoms are aggravated by certain foods. Associated symptoms include fatigue. Risk factors include obesity and caffeine use (Gained weight over last pregnancy ). She has tried a PPI for the symptoms. The treatment provided mild relief. Past procedures do not include an EGD.     Plan; schedule patient for EGD due to upper abdominal pain chronic reflux will obtain biopsies to rule out Munson's esophagus and peptic ulcer       The following portions of the patient's history were reviewed and updated as appropriate:   Past Medical History:   Diagnosis Date   • Callus    • Heart murmur    • Kidney stones    • Migraines      Past Surgical History:   Procedure Laterality Date   • KIDNEY STONE SURGERY       Family History   Problem Relation Age of Onset  "  • Heart disease Father    • Colon polyps Father    • Cancer Maternal Grandmother    • Heart disease Maternal Grandfather      OB History    No obstetric history on file.       Prior to Admission medications    Medication Sig Start Date End Date Taking? Authorizing Provider   clonazePAM (KlonoPIN) 0.5 MG tablet Take 0.5 mg by mouth Daily As Needed. 8/20/21  Yes Iraj Cabello MD   lovastatin (MEVACOR) 10 MG tablet TAKE 1 TABLET BY MOUTH EVERY NIGHT WITH MEAL 8/5/21  Yes Iraj Cabello MD   desogestrel-ethinyl estradiol (APRI) 0.15-30 MG-MCG per tablet Take 1 tablet by mouth Daily.    Iraj Cabello MD   omeprazole (priLOSEC) 20 MG capsule TAKE ONE CAPSULE BY MOUTH daily 30 minutes BEFORE morning meal 8/20/21   Iraj Cabello MD     No Known Allergies  Social History     Socioeconomic History   • Marital status:      Spouse name: Not on file   • Number of children: Not on file   • Years of education: Not on file   • Highest education level: Not on file   Tobacco Use   • Smoking status: Former Smoker   • Smokeless tobacco: Never Used   Substance and Sexual Activity   • Alcohol use: No   • Drug use: No   • Sexual activity: Defer       Review of Systems  Review of Systems   Constitutional: Positive for fatigue. Negative for activity change, appetite change, chills, diaphoresis, fever and unexpected weight change.   HENT: Negative for hoarse voice and trouble swallowing.    Respiratory: Negative for cough and choking.    Cardiovascular: Negative for chest pain.   Gastrointestinal: Positive for abdominal pain and heartburn. Negative for abdominal distention, anal bleeding, blood in stool, constipation, diarrhea, dysphagia, nausea, rectal pain and vomiting.        /80 (BP Location: Left arm)   Pulse 85   Ht 165.1 cm (65\")   Wt 112 kg (246 lb 9.6 oz)   BMI 41.04 kg/m²     Objective    Physical Exam  Constitutional:       General: She is not in acute distress.     Appearance: " Normal appearance. She is normal weight. She is not ill-appearing.   HENT:      Head: Normocephalic and atraumatic.   Pulmonary:      Effort: Pulmonary effort is normal.   Abdominal:      General: Bowel sounds are normal. There is no distension.      Palpations: Abdomen is soft. There is no mass.      Tenderness: There is no abdominal tenderness.   Neurological:      Mental Status: She is alert.       Lab on 10/01/2020   Component Date Value Ref Range Status   • Total Cholesterol 10/01/2020 221* 0 - 200 mg/dL Final   • Triglycerides 10/01/2020 215* 0 - 150 mg/dL Final   • HDL Cholesterol 10/01/2020 53  40 - 60 mg/dL Final   • LDL Cholesterol  10/01/2020 125* 0 - 100 mg/dL Final   • VLDL Cholesterol 10/01/2020 43* 5 - 40 mg/dL Final   • LDL/HDL Ratio 10/01/2020 2.36   Final     Assessment/Plan      1. Gastroesophageal reflux disease, unspecified whether esophagitis present    2. Epigastric pain    .       Orders placed during this encounter include:  Orders Placed This Encounter   Procedures   • Follow Anesthesia Guidelines / Standing Orders     Standing Status:   Future   • Obtain Informed Consent     Standing Status:   Future     Order Specific Question:   Informed Consent Given For     Answer:   ESOPHAGOGASTRODUODENOSCOPY possible dilation       ESOPHAGOGASTRODUODENOSCOPY possible dilation (N/A)    Review and/or summary of lab tests, radiology, procedures, medications. Review and summary of old records and obtaining of history. The risks and benefits of my recommendations, as well as other treatment options were discussed with the patient today. Questions were answered.    No orders of the defined types were placed in this encounter.      Follow-up: Return in about 4 weeks (around 9/29/2021) for Recheck.          This document has been electronically signed by NISREEN Cervantes on September 1, 2021 14:12 CDT           I spent 16 minutes caring for Radha on this date of service. This time includes time spent by me  in the following activities:preparing for the visit, reviewing tests, obtaining and/or reviewing a separately obtained history, performing a medically appropriate examination and/or evaluation , counseling and educating the patient/family/caregiver, ordering medications, tests, or procedures, referring and communicating with other health care professionals , documenting information in the medical record and care coordination

## 2021-09-01 NOTE — PATIENT INSTRUCTIONS
MyPlate from USDA    MyPlate is an outline of a general healthy diet based on the 2010 Dietary Guidelines for Americans, from the U.[place] Department of Agriculture (USDA). It sets guidelines for how much food you should eat from each food group based on your age, sex, and level of physical activity.  What are tips for following MyPlate?  To follow MyPlate recommendations:  · Eat a wide variety of fruits and vegetables, grains, and protein foods.  · Serve smaller portions and eat less food throughout the day.  · Limit portion sizes to avoid overeating.  · Enjoy your food.  · Get at least 150 minutes of exercise every week. This is about 30 minutes each day, 5 or more days per week.  It can be difficult to have every meal look like MyPlate. Think about MyPlate as eating guidelines for an entire day, rather than each individual meal.  Fruits and vegetables  · Make half of your plate fruits and vegetables.  · Eat many different colors of fruits and vegetables each day.  · For a 2,000 calorie daily food plan, eat:  ? 2½ cups of vegetables every day.  ? 2 cups of fruit every day.  · 1 cup is equal to:  ? 1 cup raw or cooked vegetables.  ? 1 cup raw fruit.  ? 1 medium-sized orange, apple, or banana.  ? 1 cup 100% fruit or vegetable juice.  ? 2 cups raw leafy greens, such as lettuce, spinach, or kale.  ? ½ cup dried fruit.  Grains  · One fourth of your plate should be grains.  · Make at least half of the grains you eat each day whole grains.  · For a 2,000 calorie daily food plan, eat 6 oz of grains every day.  · 1 oz is equal to:  ? 1 slice bread.  ? 1 cup cereal.  ? ½ cup cooked rice, cereal, or pasta.  Protein  · One fourth of your plate should be protein.  · Eat a wide variety of protein foods, including meat, poultry, fish, eggs, beans, nuts, and tofu.  · For a 2,000 calorie daily food plan, eat 5½ oz of protein every day.  · 1 oz is equal to:  ? 1 oz meat, poultry, or fish.  ? ¼ cup cooked beans.  ? 1 egg.  ? ½ oz nuts  or seeds.  ? 1 Tbsp peanut butter.  Dairy  · Drink fat-free or low-fat (1%) milk.  · Eat or drink dairy as a side to meals.  · For a 2,000 calorie daily food plan, eat or drink 3 cups of dairy every day.  · 1 cup is equal to:  ? 1 cup milk, yogurt, cottage cheese, or soy milk (soy beverage).  ? 2 oz processed cheese.  ? 1½ oz natural cheese.  Fats, oils, salt, and sugars  · Only small amounts of oils are recommended.  · Avoid foods that are high in calories and low in nutritional value (empty calories), like foods high in fat or added sugars.  · Choose foods that are low in salt (sodium). Choose foods that have less than 140 milligrams (mg) of sodium per serving.  · Drink water instead of sugary drinks. Drink enough water each day to keep your urine pale yellow.  Where to find support  · Work with your health care provider or a nutrition specialist (dietitian) to develop a customized eating plan that is right for you.  · Download an shailesh (mobile application) to help you track your daily food intake.  Where to find more information  · Go to ChooseMyPlate.gov for more information.  Summary  · MyPlate is a general guideline for healthy eating from the USDA. It is based on the 2010 Dietary Guidelines for Americans.  · In general, fruits and vegetables should take up ½ of your plate, grains should take up ¼ of your plate, and protein should take up ¼ of your plate.  This information is not intended to replace advice given to you by your health care provider. Make sure you discuss any questions you have with your health care provider.  Document Revised: 05/21/2020 Document Reviewed: 03/19/2018  Elsevier Patient Education © 2021 [employer]    Food Choices for Gastroesophageal Reflux Disease, Adult  When you have gastroesophageal reflux disease (GERD), the foods you eat and your eating habits are very important. Choosing the right foods can help ease your discomfort. Think about working with a food expert (dietitian) to  help you make good choices.  What are tips for following this plan?  Reading food labels  · Read the label for foods that are low in saturated fat. Foods that may help with your symptoms include:  ? Foods that have less than 5% of daily value (DV) of fat.  ? Foods that have 0 grams of trans fat.  Cooking  · Do not song your food. Cook your food by baking, steaming, grilling, or broiling. These are all methods that do not need a lot of fat for cooking.  · To add flavor, try to use herbs that are low in spice and acidity.  Meal planning    · Choose healthy foods that are low in fat, such as:  ? Fruits and vegetables.  ? Whole grains.  ? Low-fat dairy products.  ? Lean meats, fish, and poultry.  · Eat small meals often instead of eating 3 large meals each day. Eat your meals slowly in a place where you are relaxed. Avoid bending over or lying down until 2-3 hours after eating.  · Limit high-fat foods such as fatty meats or fried foods.  · Limit your intake of oils, butter, and shortening to less than 8 teaspoons each day.  · Avoid the following:  ? Foods that cause symptoms. These may be different for different people. Keep a food diary to keep track of foods that cause symptoms.  ? Alcohol.  ? Drinking a lot of liquid with meals.  ? Eating meals during the 2-3 hours before bed.  Lifestyle  · Stay at a healthy weight. Ask your doctor what weight is healthy for you. If you need to lose weight, work with your doctor to do so safely.  · Exercise for at least 30 minutes on 5 or more days each week, or as told by your doctor.  · Wear loose-fitting clothes.  · Do not use any products that contain nicotine or tobacco, such as cigarettes, e-cigarettes, and chewing tobacco. If you need help quitting, ask your doctor.  · Sleep with the head of your bed higher than your feet. Use a wedge under the mattress or blocks under the bed frame to raise the head of the bed.  What foods should eat?    Eat a healthy, well-balanced diet of  fruits, vegetables, whole grains, low-fat dairy products, lean meats, fish, and poultry. Each person is different. Foods that may cause symptoms in one person may not cause any symptoms in another person. Work with your doctor to find foods that are safe for you.  The items listed above may not be a complete list of foods and beverages you can eat. Contact a dietitian for more information.  What foods should I avoid?  Limiting some of these foods may help in managing the symptoms of GERD. Everyone is different. Talk with a food expert or your doctor to help you find the exact foods to avoid, if any.  Fruits  Any fruits prepared with added fat. Any fruits that cause symptoms. For some people, this may include citrus fruits, such as oranges, grapefruit, pineapple, and corrine.  Vegetables  Deep-fried vegetables. French fries. Any vegetables prepared with added fat. Any vegetables that cause symptoms. For some people, this may include tomatoes and tomato products, chili peppers, onions and garlic, and horseradish.  Grains  Pastries or quick breads with added fat.  Meats and other proteins  High-fat meats, such as fatty beef or pork, hot dogs, ribs, ham, sausage, salami, and bowman. Fried meat or protein, including fried fish and fried chicken. Nuts and nut butters.  Dairy  Whole milk and chocolate milk. Sour cream. Cream. Ice cream. Cream cheese. Milkshakes.  Fats and oils  Butter. Margarine. Shortening. Ghee.  Beverages  Coffee and tea, with or without caffeine. Carbonated beverages. Sodas. Energy drinks. Fruit juice made with acidic fruits (such as orange or grapefruit). Tomato juice. Alcoholic drinks.  Sweets and desserts  Chocolate and cocoa. Donuts.  Seasonings and condiments  Pepper. Peppermint and spearmint. Added salt. Any condiments, herbs, or seasonings that cause symptoms. For some people, this may include knight, hot sauce, or vinegar-based salad dressings.  The items listed above may not be a complete list of  foods and beverages you should avoid. Contact a dietitian for more information.  Questions to ask your doctor  Diet and lifestyle changes are often the first steps that are taken to manage symptoms of GERD. If diet and lifestyle changes do not help, talk with your doctor about taking medicines.  Where to find more information  · [employer]: aboutgerd.org  Summary  · When you have GERD, food and lifestyle choices are very important in easing your symptoms.  · Eat small meals often instead of 3 large meals a day. Eat your meals slowly and in a place where you are relaxed.  · Avoid bending over or lying down until 2-3 hours after eating.  · Limit high-fat foods such as fatty meat or fried foods.  This information is not intended to replace advice given to you by your health care provider. Make sure you discuss any questions you have with your health care provider.  Document Revised: 10/12/2020 Document Reviewed: 10/12/2020  Elsevier Patient Education © 2021 [employer]

## 2021-10-01 PROCEDURE — 87635 SARS-COV-2 COVID-19 AMP PRB: CPT | Performed by: FAMILY MEDICINE

## 2021-11-11 ENCOUNTER — HOSPITAL ENCOUNTER (EMERGENCY)
Facility: HOSPITAL | Age: 41
Discharge: HOME | End: 2021-11-11
Admitting: NURSE PRACTITIONER

## 2021-11-11 VITALS
RESPIRATION RATE: 16 BRPM | DIASTOLIC BLOOD PRESSURE: 69 MMHG | HEART RATE: 67 BPM | TEMPERATURE: 97.8 F | OXYGEN SATURATION: 100 % | SYSTOLIC BLOOD PRESSURE: 117 MMHG

## 2021-11-11 PROCEDURE — 25010000002 INJECTION, CASIRIVIMAB AND IMDEVIMAB, 1200 MG: Performed by: NURSE PRACTITIONER

## 2021-11-11 PROCEDURE — 99202 OFFICE O/P NEW SF 15 MIN: CPT

## 2021-11-11 PROCEDURE — M0243 CASIRIVI AND IMDEVI INFUSION: HCPCS | Performed by: NURSE PRACTITIONER

## 2021-11-11 RX ORDER — SODIUM CHLORIDE 9 MG/ML
30 INJECTION, SOLUTION INTRAVENOUS ONCE
Status: COMPLETED | OUTPATIENT
Start: 2021-11-11 | End: 2021-11-11

## 2021-11-11 RX ORDER — DIPHENHYDRAMINE HCL 50 MG
50 CAPSULE ORAL ONCE AS NEEDED
Status: DISCONTINUED | OUTPATIENT
Start: 2021-11-11 | End: 2021-11-11 | Stop reason: HOSPADM

## 2021-11-11 RX ORDER — DIPHENHYDRAMINE HYDROCHLORIDE 50 MG/ML
50 INJECTION INTRAMUSCULAR; INTRAVENOUS ONCE AS NEEDED
Status: DISCONTINUED | OUTPATIENT
Start: 2021-11-11 | End: 2021-11-11 | Stop reason: HOSPADM

## 2021-11-11 RX ORDER — EPINEPHRINE 1 MG/ML
0.3 INJECTION, SOLUTION INTRAMUSCULAR; SUBCUTANEOUS ONCE AS NEEDED
Status: DISCONTINUED | OUTPATIENT
Start: 2021-11-11 | End: 2021-11-11 | Stop reason: HOSPADM

## 2021-11-11 RX ORDER — METHYLPREDNISOLONE SODIUM SUCCINATE 125 MG/2ML
125 INJECTION, POWDER, LYOPHILIZED, FOR SOLUTION INTRAMUSCULAR; INTRAVENOUS ONCE AS NEEDED
Status: DISCONTINUED | OUTPATIENT
Start: 2021-11-11 | End: 2021-11-11 | Stop reason: HOSPADM

## 2021-11-11 RX ADMIN — IMDEVIMAB: 1332 INJECTION, SOLUTION, CONCENTRATE INTRAVENOUS at 10:25

## 2021-11-11 RX ADMIN — SODIUM CHLORIDE 30 ML: 9 INJECTION, SOLUTION INTRAVENOUS at 10:43

## 2022-05-26 PROBLEM — Z98.84 BARIATRIC SURGERY STATUS: Status: ACTIVE | Noted: 2022-05-26

## 2023-03-29 ENCOUNTER — PREP FOR SURGERY (OUTPATIENT)
Dept: OTHER | Facility: HOSPITAL | Age: 43
End: 2023-03-29
Payer: COMMERCIAL

## 2023-03-29 DIAGNOSIS — Z80.0 FAMILY HISTORY OF MALIGNANT NEOPLASM OF GASTROINTESTINAL TRACT: Primary | ICD-10-CM

## 2023-03-29 RX ORDER — SODIUM CHLORIDE 9 MG/ML
40 INJECTION, SOLUTION INTRAVENOUS AS NEEDED
OUTPATIENT
Start: 2023-03-29

## 2023-03-29 RX ORDER — DEXTROSE AND SODIUM CHLORIDE 5; .45 G/100ML; G/100ML
30 INJECTION, SOLUTION INTRAVENOUS CONTINUOUS PRN
OUTPATIENT
Start: 2023-03-29

## 2023-06-01 ENCOUNTER — HOSPITAL ENCOUNTER (OUTPATIENT)
Facility: HOSPITAL | Age: 43
Setting detail: HOSPITAL OUTPATIENT SURGERY
Discharge: HOME | End: 2023-06-01
Attending: INTERNAL MEDICINE | Admitting: INTERNAL MEDICINE
Payer: COMMERCIAL

## 2023-06-01 ENCOUNTER — ANESTHESIA EVENT (OUTPATIENT)
Dept: GASTROENTEROLOGY | Facility: HOSPITAL | Age: 43
End: 2023-06-01
Payer: COMMERCIAL

## 2023-06-01 ENCOUNTER — ANESTHESIA (OUTPATIENT)
Dept: GASTROENTEROLOGY | Facility: HOSPITAL | Age: 43
End: 2023-06-01
Payer: COMMERCIAL

## 2023-06-01 VITALS
BODY MASS INDEX: 31.16 KG/M2 | HEART RATE: 73 BPM | TEMPERATURE: 96.4 F | RESPIRATION RATE: 18 BRPM | DIASTOLIC BLOOD PRESSURE: 70 MMHG | SYSTOLIC BLOOD PRESSURE: 107 MMHG | HEIGHT: 65 IN | OXYGEN SATURATION: 100 % | WEIGHT: 187 LBS

## 2023-06-01 DIAGNOSIS — Z80.0 FAMILY HISTORY OF MALIGNANT NEOPLASM OF GASTROINTESTINAL TRACT: ICD-10-CM

## 2023-06-01 PROCEDURE — 25010000002 PROPOFOL 10 MG/ML EMULSION: Performed by: NURSE ANESTHETIST, CERTIFIED REGISTERED

## 2023-06-01 RX ORDER — SODIUM CHLORIDE 9 MG/ML
40 INJECTION, SOLUTION INTRAVENOUS AS NEEDED
Status: DISCONTINUED | OUTPATIENT
Start: 2023-06-01 | End: 2023-06-01 | Stop reason: HOSPADM

## 2023-06-01 RX ORDER — PROPOFOL 10 MG/ML
VIAL (ML) INTRAVENOUS AS NEEDED
Status: DISCONTINUED | OUTPATIENT
Start: 2023-06-01 | End: 2023-06-01 | Stop reason: SURG

## 2023-06-01 RX ORDER — DEXTROSE AND SODIUM CHLORIDE 5; .45 G/100ML; G/100ML
30 INJECTION, SOLUTION INTRAVENOUS CONTINUOUS PRN
Status: DISCONTINUED | OUTPATIENT
Start: 2023-06-01 | End: 2023-06-01 | Stop reason: HOSPADM

## 2023-06-01 RX ADMIN — PROPOFOL 70 MG: 10 INJECTION, EMULSION INTRAVENOUS at 10:07

## 2023-06-01 RX ADMIN — DEXTROSE AND SODIUM CHLORIDE 30 ML/HR: 5; 450 INJECTION, SOLUTION INTRAVENOUS at 09:46

## 2023-06-01 RX ADMIN — PROPOFOL 10 MG: 10 INJECTION, EMULSION INTRAVENOUS at 10:16

## 2023-06-01 RX ADMIN — PROPOFOL 20 MG: 10 INJECTION, EMULSION INTRAVENOUS at 10:12

## 2023-06-01 RX ADMIN — PROPOFOL 20 MG: 10 INJECTION, EMULSION INTRAVENOUS at 10:18

## 2023-06-01 RX ADMIN — PROPOFOL 30 MG: 10 INJECTION, EMULSION INTRAVENOUS at 10:11

## 2023-06-01 RX ADMIN — PROPOFOL 20 MG: 10 INJECTION, EMULSION INTRAVENOUS at 10:13

## 2023-06-01 RX ADMIN — PROPOFOL 10 MG: 10 INJECTION, EMULSION INTRAVENOUS at 10:09

## 2023-06-01 RX ADMIN — PROPOFOL 20 MG: 10 INJECTION, EMULSION INTRAVENOUS at 10:08

## 2023-06-01 NOTE — H&P
Benji Chavez DO,[facility]  Gastroenterology  Hepatology  Endoscopy  Board Certified in Internal Medicine and gastroenterology  [address], [address]  [place]. [address]  - [phone]   F - [phone]     GASTROENTEROLOGY HISTORY AND PHYSICAL  NOTE   BENJI CHAVEZ DO.         SUBJECTIVE:   2023    Name: Radha Hough  DOD: 1980        Chief Complaint:     Subjective : Family history of colon cancer in father    Patient is 43 y.o. female presents with desire for elective colonoscopy.      ROS/HISTORY/ CURRENT MEDICATIONS/OBJECTIVE/VS/PE:   Review of Systems:  All systems unremarkable unless specified below.  Constitutional   HENT  Eyes   Respiratory    Cardiovascular  Gastrointestinal   Endocrine  Genitourinary    Musculoskeletal   Skin  Allergic/Immunologic    Neurological    Hematological  Psychiatric/Behavioral    History:     Past Medical History:   Diagnosis Date   • Anxiety    • Callus    • GERD (gastroesophageal reflux disease)     resolved after bypass surgery   • Heart murmur    • Kidney stones    • Migraines      Past Surgical History:   Procedure Laterality Date   • GASTRIC BYPASS     • KIDNEY STONE SURGERY       Family History   Problem Relation Age of Onset   • Heart disease Father    • Colon polyps Father    • Cancer Maternal Grandmother    • Heart disease Maternal Grandfather      Social History     Tobacco Use   • Smoking status: Former     Types: Cigarettes     Quit date:      Years since quittin.4   • Smokeless tobacco: Never   Vaping Use   • Vaping Use: Never used   Substance Use Topics   • Alcohol use: No   • Drug use: No     Prior to Admission medications    Medication Sig Start Date End Date Taking? Authorizing Provider   Calcium Citrate 150 MG capsule Take 1 capsule by mouth Daily.    Emergency, Nurse Jasmin RN   Cholecalciferol (VITAMIN D-3 PO) Take 1 tablet by mouth Daily.    Emergency, Nurse Jasmin RN   clonazePAM (KlonoPIN) 0.5 MG tablet Take 1  tablet by mouth Daily As Needed. 8/20/21   Provider, MD Iraj   Omega-3 Fatty Acids (fish oil) 1000 MG capsule capsule Take 2 capsules by mouth Daily With Breakfast.    Emergency, Nurse Jasmin, RN   lovastatin (MEVACOR) 10 MG tablet TAKE 1 TABLET BY MOUTH EVERY NIGHT WITH MEAL 8/5/21 5/26/22  ProviderIraj MD     Allergies:  Patient has no known allergies.    I have reviewed the patients medical history, surgical history and family history in the available medical record system.     Current Medications:     Current Facility-Administered Medications   Medication Dose Route Frequency Provider Last Rate Last Admin   • dextrose 5 % and sodium chloride 0.45 % infusion  30 mL/hr Intravenous Continuous PRN Rigo Guzman DO 30 mL/hr at 06/01/23 0946 30 mL/hr at 06/01/23 0946   • sodium chloride 0.9 % infusion 40 mL  40 mL Intravenous PRN Rigo Guzman DO           Objective     Physical Exam:   Temp:  [97.3 °F (36.3 °C)] 97.3 °F (36.3 °C)  Heart Rate:  [60] 60  Resp:  [18] 18  BP: (95)/(58) 95/58    Physical Exam:  General Appearance:    Alert, cooperative, in no acute distress   Head:    Normocephalic, without obvious abnormality, atraumatic   Eyes:            Lids and lashes normal, conjunctivae and sclerae normal, no icterus, no pallor, corneas clear, PERRLA   Ears:    Ears appear intact with no abnormalities noted   Throat:   No oral lesions, no thrush, oral mucosa moist   Neck:   No adenopathy, supple, trachea midline, no thyromegaly, no  carotid bruit, no JVD   Back:     No kyphosis present, no scoliosis present, no skin lesions,   erythema or scars, no tenderness to percussion or                 palpation,  range of motion normal   Lungs:     Clear to auscultation,respirations regular, even and         unlabored    Heart:    Regular rhythm and normal rate, normal S1 and S2, no  murmur, no gallop, no rub, no click   Breast Exam:    Deferred   Abdomen:     Normal bowel sounds, no masses, no  organomegaly, soft  nontender, nondistended, no guarding, no rebound                 tenderness   Genitalia:    Deferred   Extremities:   Moves all extremities well, no edema, no cyanosis, no          redness   Pulses:   Pulses palpable and equal bilaterally   Skin:   No bleeding, bruising or rash   Lymph nodes:   No palpable adenopathy   Neurologic:   Cranial nerves 2 - 12 grossly intact, sensation intact, DTR     present and equal bilaterally      Results Review:     Lab Results   Component Value Date    WBC 5.54 02/20/2020    HGB 13.5 02/20/2020    HCT 40.3 02/20/2020     02/20/2020             No results found for: LIPASE  No results found for: INR  No results found for: CULTURE    Radiology Review:  Imaging Results (Last 72 Hours)     ** No results found for the last 72 hours. **           I reviewed the patient's new clinical results.  I reviewed the patient's new imaging results and agree with the interpretation.     ASSESSMENT/PLAN:   ASSESSMENT:  1.  Screening for high risk for colon cancer  2.  Family history of colon cancer    PLAN:  1.  Colonoscopy    Risk and benefits associated with the procedure are reviewed with the patient.  The patient wished to proceed     Rigo Guzman DO  06/01/23  09:57 CDT

## 2023-06-01 NOTE — ANESTHESIA POSTPROCEDURE EVALUATION
Patient: Radha Hough    Procedure Summary     Date: 06/01/23 Room / Location: [facility] ENDOSCOPY 2 / [facility] ENDOSCOPY    Anesthesia Start: 1002 Anesthesia Stop: 1022    Procedure: COLONOSCOPY Diagnosis:       Family history of malignant neoplasm of gastrointestinal tract      (Family history of malignant neoplasm of gastrointestinal tract [Z80.0])    Surgeons: Rigo Guzman DO Provider: Brooks Chase CRNA    Anesthesia Type: general ASA Status: 2          Anesthesia Type: general    Vitals  No vitals data found for the desired time range.          Post Anesthesia Care and Evaluation    Patient location during evaluation: PHASE II  Patient participation: waiting for patient participation  Level of consciousness: sleepy but conscious  Pain management: adequate    Airway patency: patent  Anesthetic complications: No anesthetic complications  PONV Status: none  Cardiovascular status: acceptable  Respiratory status: acceptable, spontaneous ventilation and room air  Hydration status: acceptable

## 2023-06-01 NOTE — ANESTHESIA PREPROCEDURE EVALUATION
Anesthesia Evaluation     Patient summary reviewed and Nursing notes reviewed   NPO Solid Status: > 8 hours  NPO Liquid Status: > 2 hours           Airway   Mallampati: I  TM distance: >3 FB  Neck ROM: full  Dental - normal exam     Pulmonary     breath sounds clear to auscultation  Cardiovascular     Rhythm: regular  Rate: normal    (+) valvular problems/murmurs murmur, murmur,       Neuro/Psych  (+) psychiatric history Anxiety,    GI/Hepatic/Renal/Endo    (+) obesity,  GERD,  renal disease stones,     Musculoskeletal     Abdominal    Substance History      OB/GYN          Other                        Anesthesia Plan    ASA 2     general   total IV anesthesia  intravenous induction     Anesthetic plan, risks, benefits, and alternatives have been provided, discussed and informed consent has been obtained with: patient.        CODE STATUS:

## 2023-06-05 LAB — REF LAB TEST METHOD: NORMAL
